# Patient Record
Sex: FEMALE | Race: WHITE | HISPANIC OR LATINO | Employment: FULL TIME | URBAN - METROPOLITAN AREA
[De-identification: names, ages, dates, MRNs, and addresses within clinical notes are randomized per-mention and may not be internally consistent; named-entity substitution may affect disease eponyms.]

---

## 2019-01-11 ENCOUNTER — OFFICE VISIT (OUTPATIENT)
Dept: URGENT CARE | Facility: CLINIC | Age: 34
End: 2019-01-11

## 2019-01-11 VITALS
WEIGHT: 130 LBS | DIASTOLIC BLOOD PRESSURE: 74 MMHG | OXYGEN SATURATION: 99 % | TEMPERATURE: 97 F | HEART RATE: 68 BPM | BODY MASS INDEX: 23.92 KG/M2 | SYSTOLIC BLOOD PRESSURE: 112 MMHG | HEIGHT: 62 IN

## 2019-01-11 DIAGNOSIS — R30.0 DYSURIA: Primary | ICD-10-CM

## 2019-01-11 LAB
BILIRUB UR QL STRIP: NEGATIVE
GLUCOSE UR QL STRIP: NEGATIVE
KETONES UR QL STRIP: NEGATIVE
LEUKOCYTE ESTERASE UR QL STRIP: NEGATIVE
PH, POC UA: 7.5 (ref 5–8)
POC BLOOD, URINE: NEGATIVE
POC NITRATES, URINE: NEGATIVE
PROT UR QL STRIP: NEGATIVE
SP GR UR STRIP: 1 (ref 1–1.03)
UROBILINOGEN UR STRIP-ACNC: NORMAL (ref 0.1–1.1)

## 2019-01-11 PROCEDURE — 81003 URINALYSIS AUTO W/O SCOPE: CPT | Mod: QW,S$GLB,, | Performed by: PHYSICIAN ASSISTANT

## 2019-01-11 PROCEDURE — 99203 OFFICE O/P NEW LOW 30 MIN: CPT | Mod: 25,S$GLB,, | Performed by: PHYSICIAN ASSISTANT

## 2019-01-11 PROCEDURE — 99203 PR OFFICE/OUTPT VISIT, NEW, LEVL III, 30-44 MIN: ICD-10-PCS | Mod: 25,S$GLB,, | Performed by: PHYSICIAN ASSISTANT

## 2019-01-11 PROCEDURE — 81003 POCT URINALYSIS, DIPSTICK, AUTOMATED, W/O SCOPE: ICD-10-PCS | Mod: QW,S$GLB,, | Performed by: PHYSICIAN ASSISTANT

## 2019-01-11 RX ORDER — NITROFURANTOIN 25; 75 MG/1; MG/1
100 CAPSULE ORAL 2 TIMES DAILY
Qty: 14 CAPSULE | Refills: 0 | Status: SHIPPED | OUTPATIENT
Start: 2019-01-11 | End: 2019-01-18

## 2019-01-11 RX ORDER — PHENAZOPYRIDINE HYDROCHLORIDE 200 MG/1
200 TABLET, FILM COATED ORAL 3 TIMES DAILY PRN
Qty: 6 TABLET | Refills: 0 | Status: SHIPPED | OUTPATIENT
Start: 2019-01-11 | End: 2019-01-13

## 2019-01-11 NOTE — LETTER
January 11, 2019      [facility] [address], [place] [address]  Phone: [phone]  Fax: [phone]       Patient: Ирина Pulido   YOB: 1985  Date of Visit: 01/11/2019    To Whom It May Concern:    Jeovany Pulido  was at [facility] on 01/11/2019. She may return to work/school on 11/12/2019 with no restrictions. If you have any questions or concerns, or if I can be of further assistance, please do not hesitate to contact me.    Sincerely,        Opal Skelton PA-C

## 2019-01-11 NOTE — PATIENT INSTRUCTIONS
"If you were prescribed a narcotic or controlled medication, do not drive or operate heavy equipment or machinery while taking these medications.  You must understand that you've received an Urgent Care treatment only and that you may be released before all your medical problems are known or treated. You, the patient, will arrange for follow up care as instructed.  Follow up with your PCP or specialty clinic as directed in the next 1-2 weeks if not improved or as needed.  You can call [phone] to schedule an appointment with the appropriate provider.  If your condition worsens we recommend that you receive another evaluation at the emergency room immediately or contact your primary medical clinics after hours call service to discuss your concerns.  Please return here or go to the Emergency Department for any concerns or worsening of condition.    Take tylenol (acetominophen) for fever, chills or body aches every 4 hours. do not exceed 4000 mg/ day. Take Motrin (Ibuprofen) every 4 hours for fever, chills, pain or inflammation. Do not exceed 2400 mg/day.    Patient's first language is Bulgarian, but patient states that she understands in English and does not need an  at this visit. Patient aware and verbalized understanding.      Disuria     El dolor al orinar (disuria) suele ser consecuencia de un problema en las vías urinarias.   La disuria consiste en chrissy sensación dolorosa al orinar. Siga leyendo para obtener más información sobre la disuria y foreman tratamiento.  ¿Cuáles son las causas de la disuria?  Las posibles causas incluyen:  · Infección con chrissy bacteria o virus. Puede producir chrissy infección de las vías urinarias ("UTI", por hang siglas en inglés) o chrissy infección de transmisión sexual ("STI", por hang siglas en inglés).  · Sensibilidad o alergias a ciertas sustancias químicas. Pueden estar en las lociones u otros productos.  · Problemas de la próstata o de la vejiga.  · Radioterapia en la neisha " "pélvica.   ¿Cómo se diagnostica la disuria?  Foreman proveedor de atención médica le hará un examen y le preguntará sobre hang síntomas y hang antecedentes de joni. Luego de realizarle un examen físico y de hablar con usted, probablemente foreman proveedor de atención médica sabrá las causas de foreman disuria. Generalmente le pedirá chrissy muestra de foreman orina. Se realizan exámenes, o "análisis de orina" con opal muestra. Ce examen puede incluir: observar la muestra de orina (examen visual), verificar la presencia de sustancias (examen químico) y observar chrissy cantidad pequeña con un microscopio (examen microscópico). Algunas partes del análisis de orina se pueden realizar en el consultorio del proveedor y otras se harán en un laboratorio. También es posible que la muestra de orina se analice para determinar si tiene bacterias y hongos levaduriformes (cultivo de orina). Foreman proveedor de atención médica le explicará con más detalle estas pruebas en julian de que usted las necesite.  ¿Cómo se trata la disuria?  El tratamiento dependerá de la causa de foreman disuria. Si la causa es chrissy infección por bacterias, es posible que le receten antibióticos (medicamentos para combatir la infección). También podrían darle medicamentos para facilitarle orinar y ayudarle a aliviar el dolor. Foreman proveedor de atención médica puede darle más información sobre hang opciones de tratamiento. Si los síntomas se sarah sin tratar, pueden empeorar.  Llame a foreman proveedor de atención médica de inmediato en cualquiera de los siguientes casos:  · Fiebre de 100.4 °F (38.0 °C) o superior  · No hay mejoría después de aleksander darien de tratamiento  · Dificultad para orinar a causa del dolor  · Secreción nueva o en aumento procedente de la vagina o del pene  · Erupción cutánea o dolor en las articulaciones  · Dolor que va en aumento en la espalda o en el abdomen  · Ganglios linfáticos agrandados (masas) y con dolor en las ingles   Date Last Reviewed: 9/24/2014  © 0406-2070 The " [employer]. [address] [place] [id number]. Todos los derechos reservados. Esta información no pretende sustituir la atención médica profesional. Sólo foreman médico puede diagnosticar y tratar un problema de joni.        Understanding Urinary Tract Infections (UTIs)  Most UTIs are caused by bacteria, although they may also be caused by viruses or fungi. Bacteria from the bowel are the most common source of infection. The infection may start because of any of the following:  · Sexual activity. During sex, bacteria can travel from the penis, vagina, or rectum into the urethra.   · Bacteria on the skin outside the rectum may travel into the urethra. This is more common in women since the rectum and urethra are closer to each other than in men. Wiping from front to back after using the toilet and keeping the area clean can help prevent germs from getting to the urethra.  · Blockage of urine flow through the urinary tract. If urine sits too long, germs may start to grow out of control.      Parts of the urinary tract  The infection can occur in any part of the urinary tract.  · The kidneys collect and store urine.  · The ureters carry urine from the kidneys to the bladder.  · The bladder holds urine until you are ready to let it out.  · The urethra carries urine from the bladder out of the body. It is shorter in women, so bacteria can move through it more easily. The urethra is longer in men, so a UTI is less likely to reach the bladder or kidneys in men.  Date Last Reviewed: 1/1/2017 © 2000-2017 The [employer]. [address] [place] [address]. All rights reserved. This information is not intended as a substitute for professional medical care. Always follow your healthcare professional's instructions.        Qué son las infecciones de las vías urinarias (IVU)  La mayoría de las IVU son causadas por bacterias, aunque también pueden ser causadas por virus u hongos. Las bacterias del  intestino son la lori más común de infección. Esta puede aparecer por cualquiera de las siguientes razones:  · La actividad sexual. Jessica las relaciones sexuales, los microbios pueden pasar desde el pene, la vagina o el recto hasta la uretra.  · Los microbios que están en la piel o el recto pueden pasar al interior de la uretra. [place] es más común en las mujeres, ya que el recto y la uretra están más cerca entre sí que en los hombres. Limpiarse de adelante hacia atrás después de usar el inodoro y mantener el área limpia puede ayudar a evitar que los gérmenes lleguen a la uretra.  · Bloqueo de la orina que fluye por las vías urinarias. Si la orina se queda estancada por demasiado tiempo, los microbios pueden crecer de forma descontrolada.      Partes de las vías urinarias  La infección puede afectar cualquier parte de las vías urinarias.  · Los riñones recogen y almacenan la orina.  · Los uréteres transportan la orina de los riñones a la vejiga.  · La vejiga almacena la orina hasta que el momento de ser expulsada.  · Lauretra transporta la orina desde la vejiga hasta el exterior del cuerpo. La uretra es más corta en las mujeres, por lo que las bacterias pueden viajar en opal neisha más fácilmente. La uretra masculina es más larga, es menos probable que chrissy IVU llegue a la vejiga o los riñones de los hombres.  Date Last Reviewed: 9/8/2014  © 4077-4689 [employer]. [address] [place] [address], [place] [address]. Todos los derechos reservados. Esta información no pretende sustituir la atención médica profesional. Sólo foreman médico puede diagnosticar y tratar un problema de joni.

## 2019-01-11 NOTE — PROGRESS NOTES
"Subjective:       Patient ID: Ирина Pulido is a 33 y.o. female.    Vitals:  height is 5' 2" (1.575 m) and weight is 59 kg (130 lb). Her temperature is 97.2 °F (36.2 °C). Her blood pressure is 112/74 and her pulse is 68. Her oxygen saturation is 99%.     Chief Complaint: Urinary Tract Infection    Patient presents to urgent care complaining of dysuria x 4 days. Patient also reports urinary frequency, hesitancy and urgency. Patient denies fever, CP, SOB, abdominal pain, suprapubic pain, nausea, vomiting, diarrhea and constipation. Patient reports mild back/flank pain that comes and goes throughout the day. Patient is sexually active with 1 partner. Patient denies vaginal discharge or odor.       Urinary Tract Infection    This is a new problem. The current episode started in the past 7 days. The patient is experiencing no pain. There has been no fever. She is sexually active. There is no history of pyelonephritis. Associated symptoms include flank pain, frequency and urgency. Pertinent negatives include no chills, hematuria, nausea, vomiting or rash. She has tried acetaminophen for the symptoms. The treatment provided mild relief.       Constitution: Negative for chills and fever.   Neck: Negative for painful lymph nodes.   Gastrointestinal: Negative for abdominal pain, nausea and vomiting.   Genitourinary: Positive for dysuria, frequency, urgency and flank pain. Negative for urine decreased, hematuria, history of kidney stones, painful menstruation, irregular menstruation, missed menses, heavy menstrual bleeding, ovarian cysts, genital trauma, vaginal pain, vaginal discharge, vaginal bleeding, vaginal odor, painful intercourse, genital sore, painful ejaculation and pelvic pain.   Musculoskeletal: Negative for back pain.   Skin: Negative for rash and lesion.   Hematologic/Lymphatic: Negative for swollen lymph nodes.       Objective:      Physical Exam   Constitutional: She is oriented to person, place, " and time. She appears well-developed and well-nourished.   HENT:   Head: Normocephalic and atraumatic.   Right Ear: External ear normal.   Left Ear: External ear normal.   Nose: Nose normal. No nasal deformity. No epistaxis.   Mouth/Throat: Oropharynx is clear and moist and mucous membranes are normal.   Eyes: Conjunctivae and lids are normal.   Neck: Trachea normal, normal range of motion and phonation normal. Neck supple.   Cardiovascular: Normal rate, regular rhythm, normal heart sounds and normal pulses.   Pulmonary/Chest: Effort normal and breath sounds normal. No accessory muscle usage or stridor. She has no decreased breath sounds. She has no wheezes. She has no rhonchi. She has no rales.   Abdominal: Soft. Normal appearance and bowel sounds are normal. She exhibits no distension and no mass. There is no tenderness. There is no rigidity, no rebound, no guarding, no CVA tenderness, no tenderness at McBurney's point and negative Sultana's sign.   Neurological: She is alert and oriented to person, place, and time.   Skin: Skin is warm, dry and intact.   Psychiatric: She has a normal mood and affect. Her speech is normal and behavior is normal. Cognition and memory are normal.   Nursing note and vitals reviewed.        Results for orders placed or performed in visit on 01/11/19   POCT Urinalysis, Dipstick, Automated, W/O Scope   Result Value Ref Range    POC Blood, Urine Negative Negative    POC Bilirubin, Urine Negative Negative    POC Urobilinogen, Urine neg 0.1 - 1.1    POC Ketones, Urine Negative Negative    POC Protein, Urine Negative Negative    POC Nitrates, Urine Negative Negative    POC Glucose, Urine Negative Negative    pH, UA 7.5 5 - 8    POC Specific Gravity, Urine 1.005 1.003 - 1.029    POC Leukocytes, Urine Negative Negative       Assessment:       1. Dysuria        Plan:         Dysuria  -     POCT Urinalysis, Dipstick, Automated, W/O Scope  -     phenazopyridine (PYRIDIUM) 200 MG tablet; Take 1  tablet (200 mg total) by mouth 3 (three) times daily as needed for Pain.  Dispense: 6 tablet; Refill: 0  -     nitrofurantoin, macrocrystal-monohydrate, (MACROBID) 100 MG capsule; Take 1 capsule (100 mg total) by mouth 2 (two) times daily. for 7 days  Dispense: 14 capsule; Refill: 0  -     Culture, Urine      Patient Instructions     If you were prescribed a narcotic or controlled medication, do not drive or operate heavy equipment or machinery while taking these medications.  You must understand that you've received an Urgent Care treatment only and that you may be released before all your medical problems are known or treated. You, the patient, will arrange for follow up care as instructed.  Follow up with your PCP or specialty clinic as directed in the next 1-2 weeks if not improved or as needed.  You can call [phone] to schedule an appointment with the appropriate provider.  If your condition worsens we recommend that you receive another evaluation at the emergency room immediately or contact your primary medical clinics after hours call service to discuss your concerns.  Please return here or go to the Emergency Department for any concerns or worsening of condition.    Take tylenol (acetominophen) for fever, chills or body aches every 4 hours. do not exceed 4000 mg/ day. Take Motrin (Ibuprofen) every 4 hours for fever, chills, pain or inflammation. Do not exceed 2400 mg/day.    Patient's first language is [place], but patient states that she understands in English and does not need an  at this visit. Patient aware and verbalized understanding.      Disuria     El dolor al orinar (disuria) suele ser consecuencia de un problema en las vías urinarias.   La disuria consiste en chrissy sensación dolorosa al orinar. Siga leyendo para obtener más información sobre la disuria y foreman tratamiento.  ¿Cuáles son las causas de la disuria?  Las posibles causas incluyen:  · Infección con chrissy bacteria o virus.  "Puede producir chrissy infección de las vías urinarias ("UTI", por hang siglas en inglés) o chrissy infección de transmisión sexual ("STI", por hang siglas en inglés).  · Sensibilidad o alergias a ciertas sustancias químicas. Pueden estar en las lociones u otros productos.  · Problemas de la próstata o de la vejiga.  · Radioterapia en la neisha pélvica.   ¿Cómo se diagnostica la disuria?  Foreman proveedor de atención médica le hará un examen y le preguntará sobre hang síntomas y hang antecedentes de joni. Luego de realizarle un examen físico y de hablar con usted, probablemente foreman proveedor de atención médica sabrá las causas de foreman disuria. Generalmente le pedirá chrissy muestra de foreman orina. Se realizan exámenes, o "análisis de orina" con opal muestra. Ce examen puede incluir: observar la muestra de orina (examen visual), verificar la presencia de sustancias (examen químico) y observar chrissy cantidad pequeña con un microscopio (examen microscópico). Algunas partes del análisis de orina se pueden realizar en el consultorio del proveedor y otras se harán en un laboratorio. También es posible que la muestra de orina se analice para determinar si tiene bacterias y hongos levaduriformes (cultivo de orina). Foreman proveedor de atención médica le explicará con más detalle estas pruebas en julian de que usted las necesite.  ¿Cómo se trata la disuria?  El tratamiento dependerá de la causa de foreman disuria. Si la causa es chrissy infección por bacterias, es posible que le receten antibióticos (medicamentos para combatir la infección). También podrían darle medicamentos para facilitarle orinar y ayudarle a aliviar el dolor. Foreman proveedor de atención médica puede darle más información sobre hang opciones de tratamiento. Si los síntomas se sarah sin tratar, pueden empeorar.  Llame a foreman proveedor de atención médica de inmediato en cualquiera de los siguientes casos:  · Fiebre de 100.4 °F (38.0 °C) o superior  · No hay mejoría después de aleksander darien de " tratamiento  · Dificultad para orinar a causa del dolor  · Secreción nueva o en aumento procedente de la vagina o del pene  · Erupción cutánea o dolor en las articulaciones  · Dolor que va en aumento en la espalda o en el abdomen  · Ganglios linfáticos agrandados (masas) y con dolor en las ingles   Date Last Reviewed: 9/24/2014 © 2000-2017 [employer]. [address] [place] [id number]. Todos los derechos reservados. Esta información no pretende sustituir la atención médica profesional. Sólo foreman médico puede diagnosticar y tratar un problema de joni.        Understanding Urinary Tract Infections (UTIs)  Most UTIs are caused by bacteria, although they may also be caused by viruses or fungi. Bacteria from the bowel are the most common source of infection. The infection may start because of any of the following:  · Sexual activity. During sex, bacteria can travel from the penis, vagina, or rectum into the urethra.   · Bacteria on the skin outside the rectum may travel into the urethra. This is more common in women since the rectum and urethra are closer to each other than in men. Wiping from front to back after using the toilet and keeping the area clean can help prevent germs from getting to the urethra.  · Blockage of urine flow through the urinary tract. If urine sits too long, germs may start to grow out of control.      Parts of the urinary tract  The infection can occur in any part of the urinary tract.  · The kidneys collect and store urine.  · The ureters carry urine from the kidneys to the bladder.  · The bladder holds urine until you are ready to let it out.  · The urethra carries urine from the bladder out of the body. It is shorter in women, so bacteria can move through it more easily. The urethra is longer in men, so a UTI is less likely to reach the bladder or kidneys in men.  Date Last Reviewed: 1/1/2017 © 2000-2017 [employer]. [address] [place] [address]  [id number]. All rights reserved. This information is not intended as a substitute for professional medical care. Always follow your healthcare professional's instructions.        Qué son las infecciones de las vías urinarias (IVU)  La mayoría de las IVU son causadas por bacterias, aunque también pueden ser causadas por virus u hongos. Las bacterias del intestino son la lori más común de infección. Esta puede aparecer por cualquiera de las siguientes razones:  · La actividad sexual. Jessica las relaciones sexuales, los microbios pueden pasar desde el pene, la vagina o el recto hasta la uretra.  · Los microbios que están en la piel o el recto pueden pasar al interior de la uretra. [place] es más común en las mujeres, ya que el recto y la uretra están más cerca entre sí que en los hombres. Limpiarse de adelante hacia atrás después de usar el inodoro y mantener el área limpia puede ayudar a evitar que los gérmenes lleguen a la uretra.  · Bloqueo de la orina que fluye por las vías urinarias. Si la orina se queda estancada por demasiado tiempo, los microbios pueden crecer de forma descontrolada.      Partes de las vías urinarias  La infección puede afectar cualquier parte de las vías urinarias.  · Los riñones recogen y almacenan la orina.  · Los uréteres transportan la orina de los riñones a la vejiga.  · La vejiga almacena la orina hasta que el momento de ser expulsada.  · Lauretra transporta la orina desde la vejiga hasta el exterior del cuerpo. La uretra es más corta en las mujeres, por lo que las bacterias pueden viajar en opal neisha más fácilmente. La uretra masculina es más larga, es menos probable que chrissy IVU llegue a la vejiga o los riñones de los hombres.  Date Last Reviewed: 9/8/2014  © 5750-1684 [employer]. [address] [place] [address], [place] [address]. Todos los derechos reservados. Esta información no pretende sustituir la atención médica profesional. Sólo foreman médico puede diagnosticar y tratar un problema  de joni.

## 2019-01-15 ENCOUNTER — TELEPHONE (OUTPATIENT)
Dept: URGENT CARE | Facility: CLINIC | Age: 34
End: 2019-01-15

## 2019-01-15 LAB
BACTERIA UR CULT: ABNORMAL
BACTERIA UR CULT: ABNORMAL
OTHER ANTIBIOTIC SUSC ISLT: ABNORMAL

## 2019-01-16 ENCOUNTER — TELEPHONE (OUTPATIENT)
Dept: URGENT CARE | Facility: CLINIC | Age: 34
End: 2019-01-16

## 2019-01-16 NOTE — TELEPHONE ENCOUNTER
----- Message from Vinny Ken NP sent at 1/15/2019  8:06 AM CST -----  Please call the patient regarding her abnormal result- tell her that the abx she was given will treat the UTI and to take it to completion

## 2019-04-07 ENCOUNTER — HOSPITAL ENCOUNTER (EMERGENCY)
Facility: HOSPITAL | Age: 34
Discharge: HOME | End: 2019-04-07
Attending: INTERNAL MEDICINE

## 2019-04-07 VITALS
OXYGEN SATURATION: 100 % | WEIGHT: 142 LBS | HEART RATE: 79 BPM | TEMPERATURE: 99 F | HEIGHT: 60 IN | BODY MASS INDEX: 27.88 KG/M2 | DIASTOLIC BLOOD PRESSURE: 74 MMHG | SYSTOLIC BLOOD PRESSURE: 118 MMHG | RESPIRATION RATE: 20 BRPM

## 2019-04-07 DIAGNOSIS — K21.9 GASTROESOPHAGEAL REFLUX DISEASE, ESOPHAGITIS PRESENCE NOT SPECIFIED: Primary | ICD-10-CM

## 2019-04-07 DIAGNOSIS — R10.13 EPIGASTRIC PAIN: ICD-10-CM

## 2019-04-07 DIAGNOSIS — E87.6 HYPOKALEMIA: ICD-10-CM

## 2019-04-07 LAB
ALBUMIN SERPL-MCNC: 3.3 G/DL
ALP SERPL-CCNC: 74 U/L
B-HCG UR QL: NEGATIVE
BILIRUB SERPL-MCNC: 0.5 MG/DL
BILIRUBIN, POC UA: NEGATIVE
BLOOD, POC UA: NEGATIVE
BUN SERPL-MCNC: 10 MG/DL
CALCIUM SERPL-MCNC: 9.2 MG/DL
CHLORIDE SERPL-SCNC: 107 MMOL/L
CLARITY, POC UA: CLEAR
COLOR, POC UA: YELLOW
CREAT SERPL-MCNC: 0.6 MG/DL
CTP QC/QA: YES
GLUCOSE SERPL-MCNC: 108 MG/DL (ref 70–110)
GLUCOSE, POC UA: NEGATIVE
KETONES, POC UA: NEGATIVE
LEUKOCYTE EST, POC UA: ABNORMAL
NITRITE, POC UA: NEGATIVE
PH UR STRIP: 7 [PH]
POC ALT (SGPT): 21 U/L
POC AST (SGOT): 19 U/L
POC TCO2: 25 MMOL/L
POTASSIUM BLD-SCNC: 3.3 MMOL/L
PROTEIN, POC UA: NEGATIVE
PROTEIN, POC: 7 G/DL
SODIUM BLD-SCNC: 143 MMOL/L
SPECIFIC GRAVITY, POC UA: 1.02
UROBILINOGEN, POC UA: 1 E.U./DL

## 2019-04-07 PROCEDURE — 81025 URINE PREGNANCY TEST: CPT | Mod: ER | Performed by: INTERNAL MEDICINE

## 2019-04-07 PROCEDURE — 63600175 PHARM REV CODE 636 W HCPCS: Mod: ER | Performed by: INTERNAL MEDICINE

## 2019-04-07 PROCEDURE — 93010 EKG 12-LEAD: ICD-10-PCS | Mod: ,,, | Performed by: INTERNAL MEDICINE

## 2019-04-07 PROCEDURE — 85025 COMPLETE CBC W/AUTO DIFF WBC: CPT | Mod: ER

## 2019-04-07 PROCEDURE — 81003 URINALYSIS AUTO W/O SCOPE: CPT | Mod: ER

## 2019-04-07 PROCEDURE — 80053 COMPREHEN METABOLIC PANEL: CPT | Mod: ER

## 2019-04-07 PROCEDURE — C9113 INJ PANTOPRAZOLE SODIUM, VIA: HCPCS | Mod: ER | Performed by: INTERNAL MEDICINE

## 2019-04-07 PROCEDURE — 93010 ELECTROCARDIOGRAM REPORT: CPT | Mod: ,,, | Performed by: INTERNAL MEDICINE

## 2019-04-07 PROCEDURE — 25000003 PHARM REV CODE 250: Mod: ER | Performed by: INTERNAL MEDICINE

## 2019-04-07 PROCEDURE — 99284 EMERGENCY DEPT VISIT MOD MDM: CPT | Mod: 25,ER

## 2019-04-07 PROCEDURE — 96375 TX/PRO/DX INJ NEW DRUG ADDON: CPT | Mod: ER

## 2019-04-07 PROCEDURE — 96374 THER/PROPH/DIAG INJ IV PUSH: CPT | Mod: ER

## 2019-04-07 PROCEDURE — 93005 ELECTROCARDIOGRAM TRACING: CPT | Mod: ER

## 2019-04-07 RX ORDER — PANTOPRAZOLE SODIUM 40 MG/1
40 TABLET, DELAYED RELEASE ORAL DAILY
Qty: 30 TABLET | Refills: 11 | Status: SHIPPED | OUTPATIENT
Start: 2019-04-07 | End: 2020-04-06

## 2019-04-07 RX ORDER — POTASSIUM CHLORIDE 20 MEQ/1
40 TABLET, EXTENDED RELEASE ORAL
Status: COMPLETED | OUTPATIENT
Start: 2019-04-07 | End: 2019-04-07

## 2019-04-07 RX ORDER — ONDANSETRON 2 MG/ML
8 INJECTION INTRAMUSCULAR; INTRAVENOUS
Status: COMPLETED | OUTPATIENT
Start: 2019-04-07 | End: 2019-04-07

## 2019-04-07 RX ORDER — PANTOPRAZOLE SODIUM 40 MG/10ML
40 INJECTION, POWDER, LYOPHILIZED, FOR SOLUTION INTRAVENOUS
Status: COMPLETED | OUTPATIENT
Start: 2019-04-07 | End: 2019-04-07

## 2019-04-07 RX ADMIN — PANTOPRAZOLE SODIUM 40 MG: 40 INJECTION, POWDER, LYOPHILIZED, FOR SOLUTION INTRAVENOUS at 01:04

## 2019-04-07 RX ADMIN — LIDOCAINE HYDROCHLORIDE: 20 SOLUTION ORAL; TOPICAL at 01:04

## 2019-04-07 RX ADMIN — POTASSIUM CHLORIDE 40 MEQ: 1500 TABLET, EXTENDED RELEASE ORAL at 01:04

## 2019-04-07 RX ADMIN — ONDANSETRON 8 MG: 2 INJECTION INTRAMUSCULAR; INTRAVENOUS at 01:04

## 2019-04-07 NOTE — ED PROVIDER NOTES
Encounter Date: 2019       History     Chief Complaint   Patient presents with    Abdominal Pain     burning pain in the epigastric region x 2 days with vomiting today. She was prescribed meds for GERD but has not taken any in over a month.      34-year-old female presents to the emergency department complaining generalized abdominal pain times approximately 1 week with the development of nausea and vomiting over the past 24 hr.  Patient states she had 2 episodes of vomiting and experiences epigastric discomfort radiating to her throat. She has a history of GERD, but states she does not take any maintenance medications to treat GERD.  She denies fever/chills.    The history is provided by the patient and a friend. A  was used.     Review of patient's allergies indicates:  No Known Allergies  Past Medical History:   Diagnosis Date    Depression     GERD (gastroesophageal reflux disease)     Migraine headache      Past Surgical History:   Procedure Laterality Date     SECTION       Family History   Problem Relation Age of Onset    No Known Problems Mother     No Known Problems Father      Social History     Tobacco Use    Smoking status: Never Smoker    Smokeless tobacco: Never Used   Substance Use Topics    Alcohol use: No    Drug use: No     Review of Systems   Constitutional: Negative for chills and fever.   Gastrointestinal: Positive for abdominal pain, nausea and vomiting. Negative for abdominal distention, blood in stool, constipation and diarrhea.   Genitourinary: Negative for decreased urine volume, difficulty urinating, dysuria, flank pain, frequency, hematuria, urgency, vaginal bleeding, vaginal discharge and vaginal pain.   All other systems reviewed and are negative.      Physical Exam     Initial Vitals [19 0025]   BP Pulse Resp Temp SpO2   125/83 89 17 98.2 °F (36.8 °C) 100 %      MAP       --         Physical Exam    Nursing note and vitals  reviewed.  Constitutional: She appears well-developed and well-nourished. No distress.   HENT:   Head: Normocephalic and atraumatic.   Right Ear: External ear normal.   Left Ear: External ear normal.   Mouth/Throat: Oropharynx is clear and moist.   Eyes: Conjunctivae and EOM are normal. Pupils are equal, round, and reactive to light.   Neck: Normal range of motion. Neck supple.   Cardiovascular: Normal rate, regular rhythm and intact distal pulses.   Pulmonary/Chest: Breath sounds normal. No respiratory distress.   Abdominal: Soft. Bowel sounds are normal. She exhibits no mass. There is tenderness (Epigastric). There is no rebound and no guarding.   Musculoskeletal: Normal range of motion.   Neurological: She is alert and oriented to person, place, and time. She has normal strength.   Skin: Skin is warm and dry.   Psychiatric: She has a normal mood and affect. Thought content normal.         ED Course   Procedures  Labs Reviewed   POCT URINALYSIS W/O SCOPE - Abnormal; Notable for the following components:       Result Value    Glucose, UA Negative (*)     Bilirubin, UA Negative (*)     Ketones, UA Negative (*)     Blood, UA Negative (*)     Protein, UA Negative (*)     Nitrite, UA Negative (*)     Leukocytes, UA Trace (*)     All other components within normal limits   POCT URINE PREGNANCY   POCT URINALYSIS W/O SCOPE   POCT CBC   POCT CMP   POCT CMP     EKG Readings: (Independently Interpreted)   Rhythm: Normal Sinus Rhythm. Heart Rate: 82. Ectopy: No Ectopy. Conduction: Normal. ST Segments: Normal ST Segments. T Waves: Normal. Clinical Impression: Normal Sinus Rhythm            Imaging Results    None          Medical Decision Making:   Initial Assessment:   34-year-old female presents to the emergency department complaining generalized abdominal pain times approximately 1 week with the development nausea and vomiting over the past 24 hr.  Patient states she had 2 episodes of vomiting and experiences epigastric  discomfort radiating to her throat. She has a history of GERD, but states she does not take any maintenance medications to treat GERD.  She denies fever/chills.    Clinical Tests:   Lab Tests: Ordered and Reviewed  ED Management:  CMP was normal except for slight hypokalemia, CBC was normal except for mild anemia and patient states she feels much better after GI cocktail, potassium and Protonix.  She was given instructions for GERD and advised to follow up with her primary care physician within the next 2 days for re-evaluation/return to the emergency department if condition worsens.                      Clinical Impression:       ICD-10-CM ICD-9-CM   1. Gastroesophageal reflux disease, esophagitis presence not specified K21.9 530.81   2. Epigastric pain R10.13 789.06   3. Hypokalemia E87.6 276.8         Disposition:   Disposition: Discharged  Condition: Stable                        Alen Mascorro MD  04/07/19 0138

## 2019-12-04 ENCOUNTER — HOSPITAL ENCOUNTER (EMERGENCY)
Facility: HOSPITAL | Age: 34
Discharge: HOME | End: 2019-12-04
Attending: EMERGENCY MEDICINE

## 2019-12-04 VITALS
HEIGHT: 59 IN | BODY MASS INDEX: 28.22 KG/M2 | DIASTOLIC BLOOD PRESSURE: 74 MMHG | TEMPERATURE: 99 F | OXYGEN SATURATION: 100 % | RESPIRATION RATE: 18 BRPM | WEIGHT: 140 LBS | HEART RATE: 80 BPM | SYSTOLIC BLOOD PRESSURE: 125 MMHG

## 2019-12-04 DIAGNOSIS — R52 PAIN: ICD-10-CM

## 2019-12-04 DIAGNOSIS — S93.492A SPRAIN OF ANTERIOR TALOFIBULAR LIGAMENT OF LEFT ANKLE, INITIAL ENCOUNTER: Primary | ICD-10-CM

## 2019-12-04 LAB
B-HCG UR QL: NEGATIVE
CTP QC/QA: YES

## 2019-12-04 PROCEDURE — 81025 URINE PREGNANCY TEST: CPT | Mod: ER | Performed by: EMERGENCY MEDICINE

## 2019-12-04 PROCEDURE — 99283 EMERGENCY DEPT VISIT LOW MDM: CPT | Mod: 25,ER

## 2019-12-05 NOTE — ED PROVIDER NOTES
Encounter Date: 2019    SCRIBE #1 NOTE: I, Nikko Morocho, am scribing for, and in the presence of,  Dr. Vizcaino. I have scribed the following portions of the note - Other sections scribed: HPI, ROS, PE.       History     Chief Complaint   Patient presents with    Ankle Pain     pt c/o L ankle pain s/p fall x 9 hours ago and L 2nd digit pain x 2 weeks     This is a 34 year old female with left ankle pain s/p fall x 9 hours ago. Patient also reports left 2nd digit pain onset 2 weeks.     The history is provided by the patient. No  was used.     Review of patient's allergies indicates:  No Known Allergies  Past Medical History:   Diagnosis Date    Depression     GERD (gastroesophageal reflux disease)     Migraine headache      Past Surgical History:   Procedure Laterality Date     SECTION       Family History   Problem Relation Age of Onset    No Known Problems Mother     No Known Problems Father      Social History     Tobacco Use    Smoking status: Never Smoker    Smokeless tobacco: Never Used   Substance Use Topics    Alcohol use: No    Drug use: No     Review of Systems   Constitutional: Negative.  Negative for fever.   HENT: Negative.  Negative for sore throat.    Eyes: Negative.    Respiratory: Negative.  Negative for shortness of breath.    Cardiovascular: Negative.  Negative for chest pain.   Gastrointestinal: Negative.  Negative for nausea and vomiting.   Endocrine: Negative.    Genitourinary: Negative.  Negative for dysuria.   Musculoskeletal: Positive for arthralgias. Negative for myalgias.   Skin: Negative.  Negative for rash.   Allergic/Immunologic: Negative.    Neurological: Negative.  Negative for weakness, numbness and headaches.   Hematological: Negative.  Negative for adenopathy.   Psychiatric/Behavioral: Negative.  Negative for behavioral problems.   All other systems reviewed and are negative.      Physical Exam     Initial Vitals [19 1911]   BP  Pulse Resp Temp SpO2   122/73 86 18 98.6 °F (37 °C) 99 %      MAP       --         Physical Exam    Nursing note and vitals reviewed.  Constitutional: She appears well-developed and well-nourished.   HENT:   Head: Normocephalic and atraumatic.   Right Ear: External ear normal.   Left Ear: External ear normal.   Nose: Nose normal.   Eyes: Conjunctivae are normal.   Neck: Normal range of motion. Neck supple.   Cardiovascular: Normal rate.   Pulmonary/Chest: No respiratory distress.   Musculoskeletal:        Left ankle: She exhibits swelling. Tenderness. AITFL tenderness found.   Neurological: She is alert and oriented to person, place, and time.   Skin: Skin is warm and dry.   Psychiatric: She has a normal mood and affect.         ED Course   Procedures  Labs Reviewed   POCT URINE PREGNANCY          Imaging Results          X-Ray Ankle Complete Left (Final result)  Result time 12/04/19 19:33:51    Final result by Uriel Cotton MD (12/04/19 19:33:51)                 Impression:      No acute osseous abnormality identified.      Electronically signed by: Uriel Cotton MD  Date:    12/04/2019  Time:    19:33             Narrative:    EXAMINATION:  XR ANKLE COMPLETE 3 VIEW LEFT    CLINICAL HISTORY:  Pain, unspecified    TECHNIQUE:  AP, lateral and oblique views of the left ankle were performed.    COMPARISON:  None    FINDINGS:  No evidence of acute displaced fracture, dislocation, or osseous destructive process.  Ankle mortise is maintained.                                 Medical Decision Making:   History:   Old Medical Records: I decided to obtain old medical records.  Clinical Tests:   Lab Tests: Ordered and Reviewed  The following lab test(s) were unremarkable: UPT  Radiological Study: Ordered and Reviewed            Scribe Attestation:   Scribe #1: I performed the above scribed service and the documentation accurately describes the services I performed. I attest to the accuracy of the note.      Results for  orders placed or performed during the hospital encounter of 12/04/19   POCT urine pregnancy   Result Value Ref Range    POC Preg Test, Ur Negative Negative     Acceptable Yes                This document was produced by a scribe under my direction and in my presence. I agree with the content of the note and have made any necessary edits.     Eriberto Vizcaino MD    12/05/2019 12:44 AM               Clinical Impression:     1. Sprain of anterior talofibular ligament of left ankle, initial encounter    2. Pain                                Eriberto Vizcaino MD  12/05/19 0044

## 2020-09-25 ENCOUNTER — HOSPITAL ENCOUNTER (EMERGENCY)
Facility: HOSPITAL | Age: 35
Discharge: HOME | End: 2020-09-25
Attending: EMERGENCY MEDICINE

## 2020-09-25 VITALS
BODY MASS INDEX: 29.06 KG/M2 | DIASTOLIC BLOOD PRESSURE: 65 MMHG | RESPIRATION RATE: 18 BRPM | HEIGHT: 60 IN | TEMPERATURE: 99 F | WEIGHT: 148 LBS | OXYGEN SATURATION: 100 % | SYSTOLIC BLOOD PRESSURE: 120 MMHG | HEART RATE: 59 BPM

## 2020-09-25 DIAGNOSIS — M54.12 CERVICAL RADICULOPATHY: ICD-10-CM

## 2020-09-25 DIAGNOSIS — M54.2 NECK PAIN: ICD-10-CM

## 2020-09-25 DIAGNOSIS — M79.601 RIGHT ARM PAIN: ICD-10-CM

## 2020-09-25 LAB
B-HCG UR QL: NEGATIVE
CTP QC/QA: YES

## 2020-09-25 PROCEDURE — 99284 EMERGENCY DEPT VISIT MOD MDM: CPT | Mod: 25,ER

## 2020-09-25 PROCEDURE — 96372 THER/PROPH/DIAG INJ SC/IM: CPT | Mod: ER

## 2020-09-25 PROCEDURE — 63600175 PHARM REV CODE 636 W HCPCS: Mod: ER | Performed by: NURSE PRACTITIONER

## 2020-09-25 PROCEDURE — 81025 URINE PREGNANCY TEST: CPT | Mod: ER | Performed by: EMERGENCY MEDICINE

## 2020-09-25 PROCEDURE — 25000003 PHARM REV CODE 250: Mod: ER | Performed by: NURSE PRACTITIONER

## 2020-09-25 RX ORDER — NAPROXEN 500 MG/1
500 TABLET ORAL 2 TIMES DAILY WITH MEALS
Qty: 28 TABLET | Refills: 0 | Status: SHIPPED | OUTPATIENT
Start: 2020-09-25 | End: 2020-10-09

## 2020-09-25 RX ORDER — LIDOCAINE 50 MG/G
1 PATCH TOPICAL DAILY
Qty: 15 PATCH | Refills: 0 | Status: SHIPPED | OUTPATIENT
Start: 2020-09-25

## 2020-09-25 RX ORDER — KETOROLAC TROMETHAMINE 30 MG/ML
30 INJECTION, SOLUTION INTRAMUSCULAR; INTRAVENOUS
Status: COMPLETED | OUTPATIENT
Start: 2020-09-25 | End: 2020-09-25

## 2020-09-25 RX ORDER — CYCLOBENZAPRINE HCL 10 MG
10 TABLET ORAL
Status: COMPLETED | OUTPATIENT
Start: 2020-09-25 | End: 2020-09-25

## 2020-09-25 RX ORDER — ORPHENADRINE CITRATE 100 MG/1
100 TABLET, EXTENDED RELEASE ORAL 2 TIMES DAILY PRN
Qty: 20 TABLET | Refills: 0 | Status: SHIPPED | OUTPATIENT
Start: 2020-09-25 | End: 2020-10-05

## 2020-09-25 RX ADMIN — KETOROLAC TROMETHAMINE 30 MG: 30 INJECTION, SOLUTION INTRAMUSCULAR; INTRAVENOUS at 11:09

## 2020-09-25 RX ADMIN — CYCLOBENZAPRINE HYDROCHLORIDE 10 MG: 10 TABLET, FILM COATED ORAL at 11:09

## 2020-09-25 NOTE — FIRST PROVIDER EVALUATION
Emergency Department TeleTriage Encounter Note      CHIEF COMPLAINT    Chief Complaint   Patient presents with    Arm Pain     R sided neck pain radiating down R arm and R hand since Monday, states she works at [employer], may have injured at work. Took IBPROFEN at 0800.       VITAL SIGNS   Initial Vitals [09/25/20 0926]   BP Pulse Resp Temp SpO2   115/71 66 18 98.6 °F (37 °C) 99 %      MAP       --            ALLERGIES    Review of patient's allergies indicates:  No Known Allergies    PROVIDER TRIAGE NOTE  This is a teletriage evaluation of a 35 y.o. female presenting to the ED with c/o right sided neck pain radiating to hand on Monday.  Pt works at a laundry mat.  Pt took Ibuprofen for pain at 0800 with no relief.   Initial orders will be placed and care will be transferred to an alternate provider when patient is roomed for a full evaluation. Any additional orders and the final disposition will be determined by that provider.         ORDERS  Labs Reviewed   POCT URINE PREGNANCY       ED Orders (720h ago, onward)    Start Ordered     Status Ordering Provider    09/25/20 0938 09/25/20 0937  POCT urine pregnancy  Once  Completed    Final result PREMA GUEVARA            Virtual Visit Note: The provider triage portion of this emergency department evaluation and documentation was performed via [employer], a HIPAA-compliant telemedicine application, in concert with a tele-presenter in the room. A face to face patient evaluation with one of my colleagues will occur once the patient is placed in an emergency department room.      DISCLAIMER: This note was prepared with [employer] voice recognition transcription software. Garbled syntax, mangled pronouns, and other bizarre constructions may be attributed to that software system.

## 2020-09-25 NOTE — Clinical Note
"Ирина Zhu" Robbin Pulido was seen and treated in our emergency department on 9/25/2020.  She may return to work on 09/28/2020.       If you have any questions or concerns, please don't hesitate to call.      Beena Shannon NP"

## 2020-09-25 NOTE — DISCHARGE INSTRUCTIONS
Le kruse recetado NORFLEX para el dolor. No tome ce medicamento mientras trabaja, laura alcohol, nada o mientras conduce u opera maquinaria pesada. Ce medicamento puede causar somnolencia, afectar el juicio y reducir la capacidad física.    Le kruse recetado naproxeno para el dolor. Ce es un medicamento antiinflamatorio no esteroideo (CHELSEY). No tome ningún CHELSEY adicional mientras esté tomando ce medicamento, incluidos (Advil, Aleve, Motrin, ibuprofeno, Mobic \ meloxicam, Naprosyn, etc.). Deje de kaye ce medicamento si experimenta: debilidad, picazón, piel u ojos amarillentos, laly en las articulaciones, vómitos con kamille, kamille o heces negras, aumento de peso inusual o hinchazón en los brazos, piernas, erin o pies.    Regrese al Departamento de Emergencias por cualquier síntoma nuevo o que empeore, incluyendo: fiebre, dolor de pecho, dificultad para respirar, pérdida del conocimiento, mareos, debilidad o cualquier otra inquietud.    Von un seguimiento con foreman Proveedor de atención primaria dentro de la semana. Si no tiene cristhian, puede comunicarse con el que figura en foreman documentación de isabella o también puede llamar a la [facility] al [phone] para programar chrissy rhina con cristhian.    Brad[place] todos los medicamentos según lo prescrito.    You have been prescribed NORFLEX for pain. Please do not take this medication while working, drinking alcohol, swimming, or while driving/operating heavy machinery. This medication may cause drowsiness, impair judgment, and reduce physical capabilities.    You have been prescribed Naproxen for pain. This is an Non-Steroidal Anti-Inflammatory (NSAID) Medication. Please do not take any additional NSAIDs while you are taking this medication including (Advil, Aleve, Motrin, Ibuprofen, Mobic\meloxicam, Naprosyn, etc.). Please stop taking this medication if you experience: weakness, itching, yellow skin or eyes, joint pains, vomiting blood, blood or black  stools, unusual weight gain, or swelling in your arms, legs, hands, or feet.     Please return to the Emergency Department for any new or worsening symptoms including: fever, chest pain, shortness of breath, loss of consciousness, dizziness, weakness, or any other concerns.     Please follow up with your Primary Care Provider within in the week. If you do not have one, you may contact the one listed on your discharge paperwork or you may also call the [facility] Appointment Desk at [phone] to schedule an appointment with one.     Please take all medication as prescribed.

## 2020-09-25 NOTE — ED PROVIDER NOTES
Encounter Date: 2020    SCRIBE #1 NOTE: I, Beena Harry, am scribing for, and in the presence of,  Beena Shannon NP. I have scribed the following portions of the note - Other sections scribed: ULISES, ROBERTO.       History     Chief Complaint   Patient presents with    Arm Pain     R sided neck pain radiating down R arm and R hand since Monday, states she works at [employer], may have injured at work. Took IBPROFEN at 0800.     Ирина Pulido is a 35 y.o. female who presents to the ED complaining of right-sided neck pain that radiates down the arm x 4 days. Denies injury or trauma. 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 States she has not experienced similar pain before. Patient is right-hand dominant. States she last took ibuprofen at 8am today with no relief. Denies medical problems or allergies to medication. Denies tobacco, drug or alcohol use. States she needs help finding a PCP.  was used ([id number]).     The history is provided by the patient. A  was used.     Review of patient's allergies indicates:  No Known Allergies  Past Medical History:   Diagnosis Date    Depression     GERD (gastroesophageal reflux disease)     Migraine headache      Past Surgical History:   Procedure Laterality Date     SECTION       Family History   Problem Relation Age of Onset    No Known Problems Mother     No Known Problems Father      Social History     Tobacco Use    Smoking status: Never Smoker    Smokeless tobacco: Never Used   Substance Use Topics    Alcohol use: No    Drug use: No     Review of Systems   Constitutional: Negative for chills and fever.   Respiratory: Negative for shortness of breath.    Cardiovascular: Negative for chest pain.   Gastrointestinal: Negative for abdominal pain.   Musculoskeletal: Positive for myalgias and neck pain.   Skin: Negative for rash.   All other  systems reviewed and are negative.      Physical Exam     Initial Vitals [09/25/20 0926]   BP Pulse Resp Temp SpO2   115/71 66 18 98.6 °F (37 °C) 99 %      MAP       --         Physical Exam    Nursing note and vitals reviewed.  Constitutional: She appears well-developed and well-nourished. She is not diaphoretic. No distress.   HENT:   Head: Normocephalic and atraumatic.   Eyes: EOM are normal. Pupils are equal, round, and reactive to light.   Neck: Normal range of motion. Neck supple.   Cardiovascular: Normal rate, regular rhythm, normal heart sounds and intact distal pulses.   Pulmonary/Chest: Breath sounds normal. No respiratory distress.   Abdominal: Soft. There is no abdominal tenderness.   Musculoskeletal: Normal range of motion.      Right elbow: Normal.     Right wrist: Normal.      Cervical back: She exhibits tenderness and spasm.        Back:       Comments: Tenderness and muscle spasm noted to right paracervical musculature and right trapezius musculature.  No midline tenderness.  No rash or skin lesion.  Full range of motion of the neck.  Right Arm with full range of motion.   Neurological: She is alert and oriented to person, place, and time.   Skin: Skin is warm and dry.   Psychiatric: She has a normal mood and affect. Her behavior is normal.         ED Course   Procedures  Labs Reviewed   POCT URINE PREGNANCY          Imaging Results          X-Ray Cervical Spine AP And Lateral (Final result)  Result time 09/25/20 11:47:08    Final result by Jerri Rodriguez MD (09/25/20 11:47:08)                 Impression:      No abnormality identified.      Electronically signed by: Jerri Rodriguez MD  Date:    09/25/2020  Time:    11:47             Narrative:    EXAMINATION:  XR CERVICAL SPINE AP LATERAL    CLINICAL HISTORY:  Cervicalgia    TECHNIQUE:  AP, lateral and open mouth views of the cervical spine were performed.    COMPARISON:  None.    FINDINGS:  Alignment is within normal limits.  There is no  loss of vertebral body height or intervertebral disc space.  Bone mineralization is normal.  Lateral masses are well aligned and the odontoid is intact.  No significant degenerative change is detected.                               X-Ray Shoulder Complete 2 View Right (Final result)  Result time 09/25/20 11:48:18    Final result by Jerri Rodriguez MD (09/25/20 11:48:18)                 Impression:      No abnormality identified.      Electronically signed by: Jerri Rodriguez MD  Date:    09/25/2020  Time:    11:48             Narrative:    EXAMINATION:  XR SHOULDER COMPLETE 2 OR MORE VIEWS RIGHT    CLINICAL HISTORY:  Pain in right arm    TECHNIQUE:  Two or three views of the right shoulder were performed.    COMPARISON:  None    FINDINGS:  There is no acute fracture, dislocation, or bone destruction.  No significant degenerative change is seen.                                 Medical Decision Making:   History:   Old Medical Records: I decided to obtain old medical records.  Independently Interpreted Test(s):   I have ordered and independently interpreted X-rays - see prior notes.  Clinical Tests:   Lab Tests: Ordered and Reviewed  Radiological Study: Ordered and Reviewed  ED Management:  35-year-old female presenting ED with right-sided neck pain and right shoulder pain.  Pain is reproducible with movement and palpation.  Muscle spasm noted.  X-ray negative for acute process such as fracture dislocation.  Will treat with NSAIDs and muscle relaxers.  Follow up with PCP.            Scribe Attestation:   Scribe #1: I performed the above scribed service and the documentation accurately describes the services I performed. I attest to the accuracy of the note.     Scribe attestation: I, MELODY Shannon, personally performed the services described in this documentation. All medical record entries made by the scribe were at my direction and in my presence.  I have reviewed the chart and agree that the record reflects my  personal performance and is accurate and complete.                  Clinical Impression:       ICD-10-CM ICD-9-CM   1. ERRONEOUS ENCOUNTER--DISREGARD      2. Neck pain  M54.2 723.1   3. Right arm pain  M79.601 729.5   4. Cervical radiculopathy  M54.12 723.4                      Disposition:   Disposition: Discharged  Condition: Stable     ED Disposition Condition    Discharge Stable        ED Prescriptions     Medication Sig Dispense Start Date End Date Auth. Provider    naproxen (NAPROSYN) 500 MG tablet Take 1 tablet (500 mg total) by mouth 2 (two) times daily with meals. for 14 days 28 tablet 9/25/2020 10/9/2020 Beena Shannon NP    orphenadrine (NORFLEX) 100 mg tablet Take 1 tablet (100 mg total) by mouth 2 (two) times daily as needed for Muscle spasms or Pain. 20 tablet 9/25/2020 10/5/2020 Beena Shannon NP    lidocaine (LIDODERM) 5 % Place 1 patch onto the skin once daily. Remove & Discard patch within 12 hours or as directed by MD 15 patch 9/25/2020  Beena Shannon NP        Follow-up Information     Follow up With Specialties Details Why Contact Info    [facility]  Schedule an appointment as soon as possible for a visit in 1 day For follow-up if you do not have a primary care doctor [address] [place] [id number]  [phone]      [facility] Emergency Department Emergency Medicine Go to  If symptoms worsen [id number] [address] [phone]                                       Beena Shannon NP  09/25/20 1004

## 2021-04-12 ENCOUNTER — PATIENT MESSAGE (OUTPATIENT)
Dept: RESEARCH | Facility: HOSPITAL | Age: 36
End: 2021-04-12

## 2022-04-21 ENCOUNTER — HOSPITAL ENCOUNTER (EMERGENCY)
Facility: HOSPITAL | Age: 37
Discharge: HOME | End: 2022-04-21
Attending: EMERGENCY MEDICINE

## 2022-04-21 VITALS
DIASTOLIC BLOOD PRESSURE: 81 MMHG | SYSTOLIC BLOOD PRESSURE: 123 MMHG | WEIGHT: 140 LBS | HEIGHT: 60 IN | TEMPERATURE: 98 F | OXYGEN SATURATION: 99 % | RESPIRATION RATE: 16 BRPM | HEART RATE: 69 BPM | BODY MASS INDEX: 27.48 KG/M2

## 2022-04-21 DIAGNOSIS — M54.12 CERVICAL RADICULOPATHY: Primary | ICD-10-CM

## 2022-04-21 LAB
B-HCG UR QL: NEGATIVE
CTP QC/QA: YES

## 2022-04-21 PROCEDURE — 63600175 PHARM REV CODE 636 W HCPCS: Mod: ER | Performed by: NURSE PRACTITIONER

## 2022-04-21 PROCEDURE — 81025 URINE PREGNANCY TEST: CPT | Mod: ER | Performed by: EMERGENCY MEDICINE

## 2022-04-21 PROCEDURE — 99284 EMERGENCY DEPT VISIT MOD MDM: CPT | Mod: 25,ER

## 2022-04-21 PROCEDURE — 25000003 PHARM REV CODE 250: Mod: ER | Performed by: NURSE PRACTITIONER

## 2022-04-21 RX ORDER — NAPROXEN 250 MG/1
250 TABLET ORAL
Status: COMPLETED | OUTPATIENT
Start: 2022-04-21 | End: 2022-04-21

## 2022-04-21 RX ORDER — PREDNISONE 20 MG/1
40 TABLET ORAL DAILY
Qty: 8 TABLET | Refills: 0 | Status: SHIPPED | OUTPATIENT
Start: 2022-04-22 | End: 2022-04-26

## 2022-04-21 RX ORDER — CYCLOBENZAPRINE HCL 10 MG
10 TABLET ORAL 3 TIMES DAILY PRN
Qty: 15 TABLET | Refills: 0 | Status: SHIPPED | OUTPATIENT
Start: 2022-04-21 | End: 2022-04-26

## 2022-04-21 RX ORDER — PREDNISONE 20 MG/1
40 TABLET ORAL
Status: COMPLETED | OUTPATIENT
Start: 2022-04-21 | End: 2022-04-21

## 2022-04-21 RX ORDER — SULINDAC 150 MG/1
150 TABLET ORAL 2 TIMES DAILY
Qty: 10 TABLET | Refills: 0 | Status: SHIPPED | OUTPATIENT
Start: 2022-04-21 | End: 2022-04-26

## 2022-04-21 RX ADMIN — NAPROXEN 250 MG: 250 TABLET ORAL at 04:04

## 2022-04-21 RX ADMIN — PREDNISONE 40 MG: 20 TABLET ORAL at 04:04

## 2022-04-21 NOTE — ED PROVIDER NOTES
"Encounter Date: 2022    SCRIBE #1 NOTE: I, Kenny Fried, am scribing for, and in the presence of,  Alexis Nichols DNP. I have scribed the following portions of the note - Other sections scribed: HPI, ROS, PE.       History     Chief Complaint   Patient presents with    Neck Pain     Co left neck into shoulder pain;     37 year old female presents to the emergency department with complaints of neck pain that radiates to the left arm onset 3 days ago. She denies numbness or tingling in the toes or incontinence. She states it was not brought on by an injury. She reports she had similar symptoms a year ago. She describes the pain as a "burning and pulling". She has not attempted treatment with any medications. There are no other signs or symptoms at this time.    The history is provided by the patient. A  was used.     Review of patient's allergies indicates:  No Known Allergies  Past Medical History:   Diagnosis Date    Depression     GERD (gastroesophageal reflux disease)     Migraine headache      Past Surgical History:   Procedure Laterality Date     SECTION       Family History   Problem Relation Age of Onset    No Known Problems Mother     No Known Problems Father      Social History     Tobacco Use    Smoking status: Never Smoker    Smokeless tobacco: Never Used   Substance Use Topics    Alcohol use: No    Drug use: No     Review of Systems   Constitutional: Negative for chills, fatigue and fever.   HENT: Negative for congestion, ear discharge, ear pain, postnasal drip, rhinorrhea, sinus pressure, sneezing, sore throat and voice change.    Eyes: Negative for discharge and itching.   Respiratory: Negative for cough, shortness of breath and wheezing.    Cardiovascular: Negative for chest pain, palpitations and leg swelling.   Gastrointestinal: Negative for abdominal pain, constipation, diarrhea, nausea and vomiting.        Negative for incontinence   Endocrine: " Negative for polydipsia, polyphagia and polyuria.   Genitourinary: Negative for dysuria, frequency, hematuria, urgency, vaginal bleeding, vaginal discharge and vaginal pain.   Musculoskeletal: Positive for neck pain (that radiates to left arm.). Negative for arthralgias and myalgias.   Skin: Negative for rash and wound.   Neurological: Negative for dizziness, seizures, syncope, weakness and numbness.   Hematological: Negative for adenopathy. Does not bruise/bleed easily.   Psychiatric/Behavioral: Negative for self-injury and suicidal ideas. The patient is not nervous/anxious.        Physical Exam     Initial Vitals [04/21/22 1512]   BP Pulse Resp Temp SpO2   123/81 69 16 98 °F (36.7 °C) 99 %      MAP       --         Physical Exam    Nursing note and vitals reviewed.  Constitutional: She appears well-developed and well-nourished. She is not diaphoretic. No distress.   HENT:   Head: Normocephalic and atraumatic.   Mouth/Throat: Oropharynx is clear and moist. No oropharyngeal exudate.   Eyes: EOM are normal. Pupils are equal, round, and reactive to light.   Neck: Neck supple.   Normal range of motion.  Cardiovascular: Normal rate, regular rhythm and intact distal pulses.   No murmur heard.  Pulmonary/Chest: Breath sounds normal. No stridor. No respiratory distress.   Abdominal: Abdomen is soft. Bowel sounds are normal. There is no abdominal tenderness.   Musculoskeletal:         General: No tenderness or edema. Normal range of motion.      Cervical back: Normal range of motion and neck supple. No tenderness.      Thoracic back: No tenderness.      Lumbar back: No tenderness.      Comments: There are no step offs present. There is a little weakness present in the left arm, and it is slightly weaker than the right arm.     Neurological: She is alert and oriented to person, place, and time. She has normal strength. No cranial nerve deficit.   Skin: Skin is warm and dry. No erythema. No pallor.   Psychiatric: She has a  normal mood and affect.         ED Course   Procedures  Labs Reviewed   POCT URINE PREGNANCY          Imaging Results    None          Medications   naproxen tablet 250 mg (250 mg Oral Given 4/21/22 1635)   predniSONE tablet 40 mg (40 mg Oral Given 4/21/22 1634)     Medical Decision Making:   History:   Old Medical Records: I decided to obtain old medical records.  Initial Assessment:   37-year-old female seen in the hallway who reports pain of the posterior neck that radiates down the left arm.  Breath sounds are clear to auscultation heart sounds without abnormality neurologic exam is normal.  She denies falls or injuries.  She has tried no over-the-counter medications for this issue.  The pain is distributed among the radial nerve.    Differential Diagnosis:   Cervical radiculopathy, vertebral fracture or subluxation, zoster  Clinical Tests:   Lab Tests: Ordered and Reviewed          Scribe Attestation:   Scribe #1: I performed the above scribed service and the documentation accurately describes the services I performed. I attest to the accuracy of the note.         I, Alexis Nichols DNP ACNP-BC FNP-C ENP-C , personally performed the services described in this documentation. All medical record entries made by the scribe were at my direction and in my presence. I have reviewed the chart and agree that the record reflects my personal performance and is accurate and complete.  ED Course as of 04/21/22 2137   Thu Apr 21, 2022   1556 BP: 123/81 [VC]   1556 Temp: 98 °F (36.7 °C) [VC]   1556 Pulse: 69 [VC]   1556 Resp: 16 [VC]   1556 SpO2: 99 % [VC]   1629 Preg Test, Ur: Negative [VC]      ED Course User Index  [VC] Alexis Nichols DNP          patient was given naproxen, prednisone in the emergency department and discharged on Clinoril and Flexeril, prednisone to follow up as directed. See AVS for additional recommendations. Medications listed herein were prescribed after reviewing the patient's allergies,  medication list, history, most recent laboratories as available.  Referrals below were provided after reviewing the patient's previous medical providers. She understands she  should return for any worsening or changes in condition.  Prior to discharge the patient was asked if she  had any additional concerns or complaints and she declined. The patient was given an opportunity to ask questions and all were answered to her satisfaction.     Clinical Impression:   Final diagnoses:  [M54.12] Cervical radiculopathy (Primary)          ED Disposition Condition    Discharge Stable        ED Prescriptions     Medication Sig Dispense Start Date End Date Auth. Provider    sulindac (CLINORIL) 150 MG tablet Take 1 tablet (150 mg total) by mouth 2 (two) times daily. for 5 days 10 tablet 4/21/2022 4/26/2022 Alexis Nichols DNP    cyclobenzaprine (FLEXERIL) 10 MG tablet Take 1 tablet (10 mg total) by mouth 3 (three) times daily as needed for Muscle spasms. 15 tablet 4/21/2022 4/26/2022 Alexis Nichols DNP    predniSONE (DELTASONE) 20 MG tablet Take 2 tablets (40 mg total) by mouth once daily. for 4 days 8 tablet 4/22/2022 4/26/2022 Alexis Nichols DNP        Follow-up Information     Follow up With Specialties Details Why Contact Info    [facility]  Schedule an appointment as soon as possible for a visit   [address] [place] [address]  [phone]             Alexis Nichols DNP  04/21/22 0400